# Patient Record
Sex: MALE | Race: WHITE
[De-identification: names, ages, dates, MRNs, and addresses within clinical notes are randomized per-mention and may not be internally consistent; named-entity substitution may affect disease eponyms.]

---

## 2017-01-02 ENCOUNTER — HOSPITAL ENCOUNTER (OUTPATIENT)
Dept: HOSPITAL 45 - C.NEUR | Age: 52
Discharge: HOME | End: 2017-01-02
Attending: INTERNAL MEDICINE
Payer: COMMERCIAL

## 2017-01-02 VITALS
BODY MASS INDEX: 47.74 KG/M2 | WEIGHT: 315 LBS | WEIGHT: 315 LBS | HEIGHT: 67.99 IN | BODY MASS INDEX: 47.74 KG/M2 | HEIGHT: 67.99 IN

## 2017-01-02 VITALS — DIASTOLIC BLOOD PRESSURE: 75 MMHG | SYSTOLIC BLOOD PRESSURE: 139 MMHG | HEART RATE: 81 BPM

## 2017-01-02 DIAGNOSIS — I48.0: ICD-10-CM

## 2017-01-02 DIAGNOSIS — G47.33: Primary | ICD-10-CM

## 2017-03-20 ENCOUNTER — HOSPITAL ENCOUNTER (OUTPATIENT)
Dept: HOSPITAL 45 - C.NEUR | Age: 52
Discharge: HOME | End: 2017-03-20
Attending: INTERNAL MEDICINE
Payer: COMMERCIAL

## 2017-03-20 VITALS — HEART RATE: 71 BPM | DIASTOLIC BLOOD PRESSURE: 63 MMHG | SYSTOLIC BLOOD PRESSURE: 116 MMHG

## 2017-03-20 VITALS
BODY MASS INDEX: 47.74 KG/M2 | HEIGHT: 67.99 IN | HEIGHT: 67.99 IN | WEIGHT: 315 LBS | BODY MASS INDEX: 47.74 KG/M2 | WEIGHT: 315 LBS

## 2017-03-20 DIAGNOSIS — G47.33: Primary | ICD-10-CM

## 2017-03-20 DIAGNOSIS — I48.0: ICD-10-CM

## 2017-03-20 DIAGNOSIS — E66.01: ICD-10-CM

## 2017-03-30 ENCOUNTER — HOSPITAL ENCOUNTER (OUTPATIENT)
Dept: HOSPITAL 45 - C.LABBFT | Age: 52
Discharge: HOME | End: 2017-03-30
Attending: INTERNAL MEDICINE
Payer: COMMERCIAL

## 2017-03-30 DIAGNOSIS — I13.10: Primary | ICD-10-CM

## 2017-03-30 DIAGNOSIS — N18.9: ICD-10-CM

## 2017-03-30 LAB
ANION GAP SERPL CALC-SCNC: 8 MMOL/L (ref 3–11)
BUN SERPL-MCNC: 16 MG/DL (ref 7–18)
BUN/CREAT SERPL: 15.9 (ref 10–20)
CALCIUM SERPL-MCNC: 9.5 MG/DL (ref 8.5–10.1)
CHLORIDE SERPL-SCNC: 99 MMOL/L (ref 98–107)
CO2 SERPL-SCNC: 32 MMOL/L (ref 21–32)
CREAT SERPL-MCNC: 0.99 MG/DL (ref 0.6–1.4)
GLUCOSE SERPL-MCNC: 204 MG/DL (ref 70–99)
PHOSPHATE SERPL-MCNC: 3.3 MG/DL (ref 2.5–4.9)
POTASSIUM SERPL-SCNC: 4.2 MMOL/L (ref 3.5–5.1)
SODIUM SERPL-SCNC: 139 MMOL/L (ref 136–145)

## 2017-04-26 ENCOUNTER — HOSPITAL ENCOUNTER (OUTPATIENT)
Dept: HOSPITAL 45 - C.LABBFT | Age: 52
Discharge: HOME | End: 2017-04-26
Attending: PHYSICIAN ASSISTANT
Payer: COMMERCIAL

## 2017-04-26 DIAGNOSIS — I13.10: ICD-10-CM

## 2017-04-26 DIAGNOSIS — E11.65: ICD-10-CM

## 2017-04-26 DIAGNOSIS — R97.20: Primary | ICD-10-CM

## 2017-04-26 LAB
ANION GAP SERPL CALC-SCNC: 2 MMOL/L (ref 3–11)
B-OH-BUTYR SERPL-SCNC: 1.11 MG/DL (ref 0.2–2.81)
BUN SERPL-MCNC: 18 MG/DL (ref 7–18)
BUN/CREAT SERPL: 12.7 (ref 10–20)
CALCIUM SERPL-MCNC: 8.9 MG/DL (ref 8.5–10.1)
CHLORIDE SERPL-SCNC: 96 MMOL/L (ref 98–107)
CHOLEST/HDLC SERPL: 2.3 {RATIO}
CO2 SERPL-SCNC: 35 MMOL/L (ref 21–32)
CREAT SERPL-MCNC: 1.4 MG/DL (ref 0.6–1.4)
GLUCOSE SERPL-MCNC: 426 MG/DL (ref 70–99)
GLUCOSE UR QL: 55 MG/DL
KETONES UR QL STRIP: 44 MG/DL
MAGNESIUM SERPL-MCNC: 2.4 MG/DL (ref 1.8–2.4)
NITRITE UR QL STRIP: 131 MG/DL (ref 0–150)
PH UR: 125 MG/DL (ref 0–200)
PHOSPHATE SERPL-MCNC: 3 MG/DL (ref 2.5–4.9)
POTASSIUM SERPL-SCNC: 4.6 MMOL/L (ref 3.5–5.1)
PSA SERPL-MCNC: 0.27 NG/ML (ref 0–4)
SODIUM SERPL-SCNC: 133 MMOL/L (ref 136–145)
VERY LOW DENSITY LIPOPROT CALC: 26 MG/DL

## 2017-04-27 LAB — EST. AVERAGE GLUCOSE BLD GHB EST-MCNC: 229 MG/DL

## 2017-09-14 ENCOUNTER — HOSPITAL ENCOUNTER (OUTPATIENT)
Dept: HOSPITAL 45 - C.NEUR | Age: 52
Discharge: HOME | End: 2017-09-14
Attending: PHYSICIAN ASSISTANT
Payer: COMMERCIAL

## 2017-09-14 VITALS — DIASTOLIC BLOOD PRESSURE: 52 MMHG | SYSTOLIC BLOOD PRESSURE: 91 MMHG | HEART RATE: 58 BPM

## 2017-09-14 VITALS
BODY MASS INDEX: 47.74 KG/M2 | HEIGHT: 67.99 IN | WEIGHT: 315 LBS | BODY MASS INDEX: 47.74 KG/M2 | WEIGHT: 315 LBS | HEIGHT: 67.99 IN

## 2017-09-14 VITALS — DIASTOLIC BLOOD PRESSURE: 69 MMHG | HEART RATE: 78 BPM | SYSTOLIC BLOOD PRESSURE: 101 MMHG

## 2017-09-14 DIAGNOSIS — G47.33: Primary | ICD-10-CM

## 2017-09-26 ENCOUNTER — HOSPITAL ENCOUNTER (OUTPATIENT)
Dept: HOSPITAL 45 - C.ULTR | Age: 52
Discharge: HOME | End: 2017-09-26
Attending: INTERNAL MEDICINE
Payer: COMMERCIAL

## 2017-09-26 DIAGNOSIS — E11.65: ICD-10-CM

## 2017-09-26 DIAGNOSIS — N28.9: Primary | ICD-10-CM

## 2017-09-26 DIAGNOSIS — C61: ICD-10-CM

## 2017-09-26 DIAGNOSIS — E66.01: ICD-10-CM

## 2017-09-26 LAB
ANION GAP SERPL CALC-SCNC: 3 MMOL/L (ref 3–11)
APPEARANCE UR: CLEAR
BASOPHILS # BLD: 0.02 K/UL (ref 0–0.2)
BASOPHILS NFR BLD: 0.2 %
BILIRUB UR-MCNC: (no result) MG/DL
BUN SERPL-MCNC: 22 MG/DL (ref 7–18)
BUN/CREAT SERPL: 20.3 (ref 10–20)
CALCIUM SERPL-MCNC: 9.1 MG/DL (ref 8.5–10.1)
CHLORIDE SERPL-SCNC: 97 MMOL/L (ref 98–107)
CO2 SERPL-SCNC: 35 MMOL/L (ref 21–32)
COLOR UR: YELLOW
COMPLETE: YES
CREAT SERPL-MCNC: 1.1 MG/DL (ref 0.6–1.4)
CREAT UR-MCNC: 86 MG/DL
EOSINOPHIL NFR BLD AUTO: 346 K/UL (ref 130–400)
GLUCOSE SERPL-MCNC: 129 MG/DL (ref 70–99)
HCT VFR BLD CALC: 39.9 % (ref 42–52)
IG%: 2.5 %
IMM GRANULOCYTES NFR BLD AUTO: 17.4 %
LYMPHOCYTES # BLD: 1.96 K/UL (ref 1.2–3.4)
MAGNESIUM SERPL-MCNC: 2.4 MG/DL (ref 1.8–2.4)
MANUAL MICROSCOPIC REQUIRED?: NO
MCH RBC QN AUTO: 29.2 PG (ref 25–34)
MCHC RBC AUTO-ENTMCNC: 32.3 G/DL (ref 32–36)
MCV RBC AUTO: 90.3 FL (ref 80–100)
MONOCYTES NFR BLD: 9 %
NEUTROPHILS # BLD AUTO: 2.5 %
NEUTROPHILS NFR BLD AUTO: 68.4 %
NITRITE UR QL STRIP: (no result)
PH UR STRIP: 8.5 [PH] (ref 4.5–7.5)
PHOSPHATE SERPL-MCNC: 2.5 MG/DL (ref 2.5–4.9)
PMV BLD AUTO: 9.4 FL (ref 7.4–10.4)
POTASSIUM SERPL-SCNC: 4.1 MMOL/L (ref 3.5–5.1)
PROT UR STRIP-MCNC: 12.2 MG/DL (ref 0–11.9)
PSA SERPL-MCNC: 0.15 NG/ML (ref 0–4)
RBC # BLD AUTO: 4.42 M/UL (ref 4.7–6.1)
REVIEW REQ?: NO
SODIUM SERPL-SCNC: 135 MMOL/L (ref 136–145)
SP GR UR STRIP: 1.02 (ref 1–1.03)
TSH SERPL-ACNC: 1.46 UIU/ML (ref 0.3–4.5)
URINE EPITHELIAL CELL AUTO: (no result) /LPF (ref 0–5)
URINE PROTIEN/CREAT RATIO: 0.1 (ref 0–0.2)
UROBILINOGEN UR-MCNC: (no result) MG/DL
WBC # BLD AUTO: 11.29 K/UL (ref 4.8–10.8)
ZZUR CULT IF INDIC CLEAN CATCH: NO

## 2017-09-26 NOTE — DIAGNOSTIC IMAGING REPORT
EXAMINATION: RENAL ULTRASOUND



CLINICAL HISTORY: N28.9 Acute renal [id number]    



COMPARISON STUDY:  8/28/2015



FINDINGS: The right kidney measures 14.5 cm. The left kidney measures 14.4 cm. 

There is no evidence of hydronephrosis.  There are no renal masses.



The bladder appears sonographically normal. Neither ureteral jet was visualized.

                 

IMPRESSION : No renal abnormalities identified. Neither ureteral jet was

visualized.







Electronically signed by:  James Palomares M.D.

9/26/2017 2:48 PM



Dictated Date/Time:  9/26/2017 2:44 PM

## 2017-09-27 LAB — EST. AVERAGE GLUCOSE BLD GHB EST-MCNC: 197 MG/DL

## 2017-10-12 ENCOUNTER — HOSPITAL ENCOUNTER (OUTPATIENT)
Dept: HOSPITAL 45 - C.LABBFT | Age: 52
Discharge: HOME | End: 2017-10-12
Attending: INTERNAL MEDICINE
Payer: COMMERCIAL

## 2017-10-12 DIAGNOSIS — N28.9: Primary | ICD-10-CM

## 2017-10-12 LAB
ANION GAP SERPL CALC-SCNC: 6 MMOL/L (ref 3–11)
BUN SERPL-MCNC: 28 MG/DL (ref 7–18)
BUN/CREAT SERPL: 25.2 (ref 10–20)
CALCIUM SERPL-MCNC: 9.2 MG/DL (ref 8.5–10.1)
CHLORIDE SERPL-SCNC: 97 MMOL/L (ref 98–107)
CO2 SERPL-SCNC: 30 MMOL/L (ref 21–32)
CREAT SERPL-MCNC: 1.1 MG/DL (ref 0.6–1.4)
GLUCOSE SERPL-MCNC: 232 MG/DL (ref 70–99)
MAGNESIUM SERPL-MCNC: 2 MG/DL (ref 1.8–2.4)
PHOSPHATE SERPL-MCNC: 2.8 MG/DL (ref 2.5–4.9)
POTASSIUM SERPL-SCNC: 4.3 MMOL/L (ref 3.5–5.1)
SODIUM SERPL-SCNC: 133 MMOL/L (ref 136–145)

## 2017-11-16 ENCOUNTER — HOSPITAL ENCOUNTER (EMERGENCY)
Dept: HOSPITAL 45 - C.EDB | Age: 52
Discharge: HOME | End: 2017-11-16
Payer: COMMERCIAL

## 2017-11-16 VITALS
BODY MASS INDEX: 47.74 KG/M2 | BODY MASS INDEX: 47.74 KG/M2 | HEIGHT: 67.99 IN | WEIGHT: 315 LBS | HEIGHT: 67.99 IN | WEIGHT: 315 LBS

## 2017-11-16 VITALS
DIASTOLIC BLOOD PRESSURE: 84 MMHG | SYSTOLIC BLOOD PRESSURE: 158 MMHG | HEART RATE: 85 BPM | TEMPERATURE: 98.6 F | OXYGEN SATURATION: 94 %

## 2017-11-16 DIAGNOSIS — Z83.3: ICD-10-CM

## 2017-11-16 DIAGNOSIS — M79.671: Primary | ICD-10-CM

## 2017-11-16 DIAGNOSIS — I10: ICD-10-CM

## 2017-11-16 DIAGNOSIS — Z82.49: ICD-10-CM

## 2017-11-16 DIAGNOSIS — E11.40: ICD-10-CM

## 2017-11-16 DIAGNOSIS — Z79.4: ICD-10-CM

## 2017-11-16 NOTE — EMERGENCY ROOM VISIT NOTE
History


First contact with patient:  19:56


Chief Complaint:  FOOT PAIN


Stated Complaint:  THINKS SOMETHING IS STUCK IN FOOT





History of Present Illness


The patient is a 52 year old male who presents to the Emergency Room via 

private vehicle accompanied by male with complaints of "thinks something is 

stuck in foot".  The patient states that about 4 weeks ago he began with pain 

in the right heel just anterior in the midfoot region.  He has a history of 

neuropathy and thinks he may have stepped on broken glass but is not sure.  He 

states that this has been progressing for the past 4 weeks and now over the 

past day has acutely worsened.  If he initially walks on it is painful and gets 

better with periods of walking but when he rests and then goes to walk again 

the pain is excruciating.  He rates the pain as an 8/10.





Review of Systems


A complete 6-point Review of Systems was discussed with the patient, with 

pertinent positives and negatives listed in the History of Present Illness. All 

remaining Review of Systems questions can be considered negative unless 

otherwise specified.





Past Medical/Surgical History


Medical Problems:


(1) Diabetes


(2) Heart disease


(3) Hypertension








Family History





Diabetes mellitus


FH: heart disease


Hypertension





Social History


Smoking Status:  Never Smoker


Drug Use:  none


Marital Status:  single, in relationship


Housing Status:  lives with family


Occupation Status:  unemployed





Current/Historical Medications


Scheduled


Allopurinol (Zyloprim), 1 TAB PO DAILY


Apixaban (Eliquis), 5 MG PO BID


Digoxin (Digoxin), 0.25 MG PO DAILY


Flecainide Acetate (Flecainide Acetate), 150 MG PO BID


Furosemide (Lasix), 40-80 MG PO BID


Home O2 Therapy (Oxygen), 2 LITERS NA PRN


Insulin Aspart (Novolog), SQ TIDM


Insulin Glargine (Lantus Solostar), 78 SC QPM


Lisinopril (Zestril), 20 MG PO QAM


Metolazone (Zaroxolyn), 2.5 MG PO DAILY


Metoprolol Succ (Toprol Xl) (Toprol-Xl), 50 MG PO QAM


Simvastatin (Zocor), 80 MG PO QPM


Tamsulosin HCl (Tamsulosin HCl), 2 TABS PO HS





Scheduled PRN


Oxycodone Ir (Roxicodone Ir), 1-2 TAB PO Q4H PRN for Pain





Physical Exam


Vital Signs











  Date Time  Temp Pulse Resp B/P (MAP) Pulse Ox O2 Delivery O2 Flow Rate FiO2


 


11/16/17 21:38 37.0 85 20 158/84 94 Room Air  


 


11/16/17 19:53 37.3 87 20 175/101 93 Room Air  











Physical Exam


VITAL SIGNS - Vital signs and nursing notes were reviewed.  Stable.  

Hypertensive.


GENERAL -52-year-old male appearing his stated age who is in no acute distress. 

Communicates well with provider and answers questions appropriately.


SKIN - Without rashes.  There is no erythema or edema of the foot.  The bottom 

of the foot is unremarkable.  There is pinpoint tenderness just anterior to the 

right heel.


HEAD - NC/AT.


EXTREMITIES - No clubbing or peripheral cyanosis. No pretibial edema present.  

Pinpoint tenderness just anterior to the base of the right heel.  There is no 

evidence of foreign body.  There is no evidence of infection.  There is no 

edema or erythema.  He is neurovascularly intact in this region.  +5/5 strength 

noted in UE/LE bilaterally.





Medical Decision & Procedures


ER Provider


Diagnostic Interpretation:


RIGHT FOOT 3 VIEWS





HISTORY:      Right anterior heel pain, possible foreign body from walking.





COMPARISON: None.





FINDINGS: There is no fracture or dislocation. Soft tissues are unremarkable. No


radiopaque foreign bodies. Small plantar heel spur.





IMPRESSION:  


No radiopaque foreign bodies seen within the right foot.











Electronically signed by:  Jj Segura M.D.


11/16/2017 9:12 PM





Dictated Date/Time:  11/16/2017 9:11 PM





Laboratory Results











Test


  11/16/17


20:57


 


Bedside Glucose


  247 mg/dl


(70-99)











Medications Administered











 Medications


  (Trade)  Dose


 Ordered  Sig/Mary


 Route  Start Time


 Stop Time Status Last Admin


Dose Admin


 


 Oxycodone HCl


  (Roxicodone


 Immediate Rel 5MG


 Home Pack)  1 homepack  UD  STAT


 PO  11/16/17 21:29


 11/16/17 21:30 DC 11/16/17 21:35


1 HOMEPACK











Medical Decision


Patient was seen and evaluated as above.  He presents to us today with right 

foot pain.  He states he believes he stepped on something but is unsure.  His 

tetanus is up-to-date.  Pain has been for about 4 weeks and acutely worsened 

over the past day.  There is been no fevers, chills erythema or edema.  There 

is pinpoint tenderness on exam.  I suspect either plantar fasciitis or retained 

foreign body.  X-rays were obtained with results as above.  No foreign body 

noted.  Although it is possible other could still be a small piece of glass, 

his history and physical exam is more consistent with that of plantar 

fasciitis.  His pain is better after walking for prolonged period of times and 

is worse after resting and then going to walk again.  It is also worse with 

dorsiflexion.  I suspect that freezing a bottle of water and doing exercises 

without a tennis ball will certainly help with this.  He was offered a splint 

and crutches and declined.  He is to follow with orthopedics regarding his 

injury today.  He was educated upon management, educated upon worrisome 

symptoms which to return, had questions prior to discharge, and was discharged 

home with male driving in good condition.  His blood sugar at this time was 

247.  He will be given a short prescription of oxycodone for pain of which she 

was also given here.  He was educated upon management and use.  He was 

hypertensive I believe secondary to his presentation but he was made aware of 

this.








In the evaluation and treatment of this patient, the following differential 

diagnoses were considered: Lisfranc Fracture, retained foreign body, plantar 

fasciitis, osteomyelitis, Talus Fracture, Tarsal Fracture, Foot Sprain.





[place] Drug Monitoring Program


Search Results:  patient reviewed within database, no issues identified





Impression





 Primary Impression:  


 Foot pain





Departure Information


Dispostion


Home / Self-Care





Condition


GOOD





Prescriptions





Oxycodone Ir (Roxicodone Ir) 5 Mg Tab


1-2 TAB PO Q4H Y for Pain, #15 TAB


   For Initial Treatment


   Prov: Geovanny Mcgraw, TALIA         11/16/17





Referrals


Filippo Burris M.D. (PCP)








Beck Niño D.O.





Patient Instructions


My [facility]





Additional Instructions





You have been treated in the Emergency Department for a foot pain/injury. 





You have been prescribed Oxy IR to be used for pain control. This is a narcotic 

medication. You cannot drive or consume alcohol while on this medicine. This 

medicine should only be used for pain that cannot be controlled with over-the-

counter pain medicines.








You have been provided the number for an Orthopaedic Surgeon. You should call 

this number as soon as possible to establish a follow-up visit from today's 

Emergency Department visit.








Return to the Emergency Department if your current symptoms worsen despite 

treatment course outlined above, or if you develop any of the following symptoms

: intractable pain despite aforementioned treatment course or new onset of 

numbness or tingling of the foot.

## 2017-11-16 NOTE — DIAGNOSTIC IMAGING REPORT
RIGHT FOOT 3 VIEWS



HISTORY:      Right anterior heel pain, possible foreign body from walking.



COMPARISON: None.



FINDINGS: There is no fracture or dislocation. Soft tissues are unremarkable. No

radiopaque foreign bodies. Small plantar heel spur.



IMPRESSION:  

No radiopaque foreign bodies seen within the right foot.







Electronically signed by:  Jj Segura M.D.

11/16/2017 9:12 PM



Dictated Date/Time:  11/16/2017 9:11 PM

## 2018-01-12 ENCOUNTER — HOSPITAL ENCOUNTER (OUTPATIENT)
Dept: HOSPITAL 45 - C.LABBFT | Age: 53
Discharge: HOME | End: 2018-01-12
Attending: PHYSICIAN ASSISTANT
Payer: COMMERCIAL

## 2018-01-12 DIAGNOSIS — I13.10: ICD-10-CM

## 2018-01-12 DIAGNOSIS — N18.9: ICD-10-CM

## 2018-01-12 DIAGNOSIS — R60.9: ICD-10-CM

## 2018-01-12 DIAGNOSIS — E11.65: Primary | ICD-10-CM

## 2018-01-12 LAB
BUN SERPL-MCNC: 23 MG/DL (ref 7–18)
CALCIUM SERPL-MCNC: 9.7 MG/DL (ref 8.5–10.1)
CO2 SERPL-SCNC: 33 MMOL/L (ref 21–32)
CREAT SERPL-MCNC: 1.12 MG/DL (ref 0.6–1.4)
GLUCOSE SERPL-MCNC: 161 MG/DL (ref 70–99)
POTASSIUM SERPL-SCNC: 4.7 MMOL/L (ref 3.5–5.1)
SODIUM SERPL-SCNC: 132 MMOL/L (ref 136–145)

## 2018-01-13 LAB — HBA1C MFR BLD: 9.7 % (ref 4.5–5.6)

## 2018-03-19 ENCOUNTER — HOSPITAL ENCOUNTER (OUTPATIENT)
Dept: HOSPITAL 45 - C.LABBFT | Age: 53
Discharge: HOME | End: 2018-03-19
Attending: INTERNAL MEDICINE
Payer: COMMERCIAL

## 2018-03-19 DIAGNOSIS — R39.12: ICD-10-CM

## 2018-03-19 DIAGNOSIS — N18.9: Primary | ICD-10-CM

## 2018-03-19 DIAGNOSIS — N40.0: ICD-10-CM

## 2018-03-19 DIAGNOSIS — C61: ICD-10-CM

## 2018-03-19 DIAGNOSIS — R39.15: ICD-10-CM

## 2018-03-19 DIAGNOSIS — M10.9: ICD-10-CM

## 2018-03-19 DIAGNOSIS — R97.20: ICD-10-CM

## 2018-03-19 DIAGNOSIS — R39.11: ICD-10-CM

## 2018-03-19 DIAGNOSIS — I48.92: ICD-10-CM

## 2018-03-19 LAB
ALBUMIN SERPL-MCNC: 3.4 GM/DL (ref 3.4–5)
ALP SERPL-CCNC: 55 U/L (ref 45–117)
ALT SERPL-CCNC: 30 U/L (ref 12–78)
AST SERPL-CCNC: 23 U/L (ref 15–37)
BASOPHILS # BLD: 0.02 K/UL (ref 0–0.2)
BASOPHILS NFR BLD: 0.2 %
BUN SERPL-MCNC: 26 MG/DL (ref 7–18)
CALCIUM SERPL-MCNC: 9.4 MG/DL (ref 8.5–10.1)
CO2 SERPL-SCNC: 31 MMOL/L (ref 21–32)
CREAT SERPL-MCNC: 1.06 MG/DL (ref 0.6–1.4)
EOS ABS #: 0.16 K/UL (ref 0–0.5)
EOSINOPHIL NFR BLD AUTO: 260 K/UL (ref 130–400)
GLUCOSE SERPL-MCNC: 178 MG/DL (ref 70–99)
HCT VFR BLD CALC: 35.1 % (ref 42–52)
HGB BLD-MCNC: 11.7 G/DL (ref 14–18)
IG#: 0.13 K/UL (ref 0–0.02)
IMM GRANULOCYTES NFR BLD AUTO: 19.4 %
LYMPHOCYTES # BLD: 1.78 K/UL (ref 1.2–3.4)
MCH RBC QN AUTO: 30.1 PG (ref 25–34)
MCHC RBC AUTO-ENTMCNC: 33.3 G/DL (ref 32–36)
MCV RBC AUTO: 90.2 FL (ref 80–100)
MONO ABS #: 0.62 K/UL (ref 0.11–0.59)
MONOCYTES NFR BLD: 6.8 %
NEUT ABS #: 6.45 K/UL (ref 1.4–6.5)
NEUTROPHILS # BLD AUTO: 1.7 %
NEUTROPHILS NFR BLD AUTO: 70.5 %
PHOSPHATE SERPL-MCNC: 2.3 MG/DL (ref 2.5–4.9)
PMV BLD AUTO: 9.8 FL (ref 7.4–10.4)
POTASSIUM SERPL-SCNC: 4.9 MMOL/L (ref 3.5–5.1)
PROT SERPL-MCNC: 7.4 GM/DL (ref 6.4–8.2)
RED CELL DISTRIBUTION WIDTH CV: 14.2 % (ref 11.5–14.5)
RED CELL DISTRIBUTION WIDTH SD: 46.5 FL (ref 36.4–46.3)
SODIUM SERPL-SCNC: 138 MMOL/L (ref 136–145)
URATE SERPL-MCNC: 7.9 MG/DL (ref 2.6–7.2)
WBC # BLD AUTO: 9.16 K/UL (ref 4.8–10.8)

## 2018-03-26 ENCOUNTER — HOSPITAL ENCOUNTER (EMERGENCY)
Dept: HOSPITAL 45 - C.EDB | Age: 53
Discharge: HOME | End: 2018-03-26
Payer: COMMERCIAL

## 2018-03-26 VITALS
WEIGHT: 315 LBS | HEIGHT: 67.99 IN | WEIGHT: 315 LBS | HEIGHT: 67.99 IN | BODY MASS INDEX: 47.74 KG/M2 | BODY MASS INDEX: 47.74 KG/M2

## 2018-03-26 VITALS — OXYGEN SATURATION: 98 % | HEART RATE: 112 BPM | DIASTOLIC BLOOD PRESSURE: 68 MMHG | SYSTOLIC BLOOD PRESSURE: 106 MMHG

## 2018-03-26 VITALS — TEMPERATURE: 98.42 F

## 2018-03-26 DIAGNOSIS — E11.9: ICD-10-CM

## 2018-03-26 DIAGNOSIS — Z99.81: ICD-10-CM

## 2018-03-26 DIAGNOSIS — Z79.01: ICD-10-CM

## 2018-03-26 DIAGNOSIS — E86.0: Primary | ICD-10-CM

## 2018-03-26 DIAGNOSIS — I48.91: ICD-10-CM

## 2018-03-26 DIAGNOSIS — Z91.030: ICD-10-CM

## 2018-03-26 DIAGNOSIS — Z82.49: ICD-10-CM

## 2018-03-26 DIAGNOSIS — Z79.84: ICD-10-CM

## 2018-03-26 DIAGNOSIS — I95.1: ICD-10-CM

## 2018-03-26 DIAGNOSIS — Z83.3: ICD-10-CM

## 2018-03-26 DIAGNOSIS — Z91.048: ICD-10-CM

## 2018-03-26 DIAGNOSIS — I11.9: ICD-10-CM

## 2018-03-26 DIAGNOSIS — Z79.4: ICD-10-CM

## 2018-03-26 LAB
ALBUMIN SERPL-MCNC: 3.4 GM/DL (ref 3.4–5)
ALP SERPL-CCNC: 59 U/L (ref 45–117)
ALT SERPL-CCNC: 35 U/L (ref 12–78)
AST SERPL-CCNC: 24 U/L (ref 15–37)
BASOPHILS # BLD: 0.02 K/UL (ref 0–0.2)
BASOPHILS NFR BLD: 0.2 %
BUN SERPL-MCNC: 49 MG/DL (ref 7–18)
CALCIUM SERPL-MCNC: 8.4 MG/DL (ref 8.5–10.1)
CK MB SERPL-MCNC: 2.5 NG/ML (ref 0.5–3.6)
CO2 SERPL-SCNC: 30 MMOL/L (ref 21–32)
CREAT SERPL-MCNC: 1.86 MG/DL (ref 0.6–1.4)
EOS ABS #: 0.21 K/UL (ref 0–0.5)
EOSINOPHIL NFR BLD AUTO: 299 K/UL (ref 130–400)
GLUCOSE SERPL-MCNC: 191 MG/DL (ref 70–99)
HCT VFR BLD CALC: 37.3 % (ref 42–52)
HGB BLD-MCNC: 12.7 G/DL (ref 14–18)
IG#: 0.17 K/UL (ref 0–0.02)
IMM GRANULOCYTES NFR BLD AUTO: 25.4 %
LIPASE: 203 U/L (ref 73–393)
LYMPHOCYTES # BLD: 2.59 K/UL (ref 1.2–3.4)
MCH RBC QN AUTO: 30.5 PG (ref 25–34)
MCHC RBC AUTO-ENTMCNC: 34 G/DL (ref 32–36)
MCV RBC AUTO: 89.4 FL (ref 80–100)
MONO ABS #: 0.82 K/UL (ref 0.11–0.59)
MONOCYTES NFR BLD: 8.1 %
NEUT ABS #: 6.37 K/UL (ref 1.4–6.5)
NEUTROPHILS # BLD AUTO: 2.1 %
NEUTROPHILS NFR BLD AUTO: 62.5 %
PMV BLD AUTO: 9.4 FL (ref 7.4–10.4)
POTASSIUM SERPL-SCNC: 3.9 MMOL/L (ref 3.5–5.1)
PROT SERPL-MCNC: 7.5 GM/DL (ref 6.4–8.2)
RED CELL DISTRIBUTION WIDTH CV: 14.2 % (ref 11.5–14.5)
RED CELL DISTRIBUTION WIDTH SD: 46.3 FL (ref 36.4–46.3)
SODIUM SERPL-SCNC: 132 MMOL/L (ref 136–145)
WBC # BLD AUTO: 10.18 K/UL (ref 4.8–10.8)

## 2018-03-26 NOTE — DIAGNOSTIC IMAGING REPORT
CHEST ONE VIEW PORTABLE



CLINICAL HISTORY: Atypical chest pain    



COMPARISON STUDY:  11/3/2016



FINDINGS: The heart is at the upper limits of normal in size. Indistinctness of

the left cardiophrenic angle is likely secondary to a prominent fat pad.

Mediastinal fullness is likely secondary to mediastinal fat deposition. There is

no failure. There is no focal pulmonary consolidation. There are no pleural

effusions.[ 



IMPRESSION: No active disease in the chest.







Electronically signed by:  James Palomares M.D.

3/26/2018 6:18 PM



Dictated Date/Time:  3/26/2018 6:17 PM

## 2018-03-26 NOTE — EMERGENCY ROOM VISIT NOTE
History


Report prepared by Katharina:  Michelle Hill


Under the Supervision of:  Dr. Edgar Moses M.D.


First contact with patient:  17:44


Chief Complaint:  IRREGULAR HEARTBEAT


Stated Complaint:  REF BY BRANDI REYES


Nursing Triage Summary:  


Patient was Oliver keller for an appointment and states he was c/o 


dizziness and Provider stated he was in Afib and sent for eval.  History of 

afib 


on blood thinners.





History of Present Illness


The patient is a 52 year old male who presents to the Emergency Room with 

complaints of persistent irregular heartbeat starting PTA. The patient was sent 

to the ED from his appointment with the bariatric doctor today over concerns 

that he is in atrial fibrillation. The patient states that he has a history of 

atrial fibrillation and is on Eliquis. He reports feeling lightheaded, dizzy, 

and SOB while trimming some trees and brush today. He notes that he is more 

swollen than usual. He is on Lasix.





   Source of History:  patient


   Onset:  PTA


   Position:  other (heartbeat)


   Quality:  other (irregular)


   Timing:  other (persistent)


   Associated Symptoms:  + SOB


Note:


Pt reports lightheadedness, dizziness.





Review of Systems


See HPI for pertinent positives & negatives. A total of 10 systems reviewed and 

were otherwise negative.





Past Medical & Surgical


Medical Problems:


(1) Diabetes


(2) Heart disease


(3) Hypertension








Family History





Diabetes mellitus


FH: heart disease


Hypertension





Social History


Smoking Status:  Never Smoker


Drug Use:  none


Housing Status:  lives with family


Occupation Status:  unemployed





Current/Historical Medications


Scheduled


Allopurinol (Zyloprim), 1 TAB PO DAILY


Apixaban (Eliquis), 5 MG PO BID


Digoxin (Digoxin), 0.25 MG PO DAILY


Flecainide Acetate (Flecainide Acetate), 150 MG PO BID


Furosemide (Lasix), 40 MG PO QPM


Furosemide (Lasix), 80 MG PO QAM


Home O2 Therapy (Oxygen), 2 LITERS NA PRN


Insulin Aspart (Novolog), SQ TIDM


Insulin Glargine (Lantus Solostar), 78 UNITS SQ QPM


Lisinopril (Zestril), 20 MG PO QAM


Metformin Hcl (Glucophage), 1,000 MG PO BID


Metolazone (Zaroxolyn), 2.5 MG PO DAILY


Metoprolol Succ (Toprol Xl) (Toprol-Xl), 50 MG PO QAM


Simvastatin (Zocor), 80 MG PO QPM


Tamsulosin HCl (Tamsulosin HCl), 0.8 MG PO HS





Allergies


Coded Allergies:  


     BEE STING (Verified  Allergy, Severe, ANAPHYLAXIS, 11/3/16)


     NO KNOWN DRUG ALLERGIES (Unverified  Allergy, Unknown, NONE, 11/3/16)


     Adhesives (Unverified  Adverse Reaction, Intermediate, BANDAIDS-RIPS SKIN 

OFF, 11/3/16)





Physical Exam


Vital Signs











  Date Time  Temp Pulse Resp B/P (MAP) Pulse Ox O2 Delivery O2 Flow Rate FiO2


 


3/26/18 19:04  86  126/76 98 Room Air  





  105  115/72    





  112  106/68    


 


3/26/18 16:57 36.9 90 20 110/65 93 Room Air  











Physical Exam


GENERAL: Awake, alert, well-appearing, in no acute distress


HENT: Normocephalic, atraumatic. Oropharynx unremarkable.


EYES: Normal conjunctiva. Sclera non-icteric.


NECK: Supple. No nuchal rigidity. FROM. No JVD.


RESPIRATORY: Clear to auscultation.


CARDIAC: Regular rate, irregularly irregular rhythm. Extremities warm and well 

perfused. Pulses equal.


ABDOMEN: Soft, non-distended. No tenderness to palpation. No rebound or 

guarding. No masses.


RECTAL: Deferred.


MUSCULOSKELETAL: Chest examination reveals no tenderness. The back is 

symmetrical on inspection without obvious abnormality. There is no CVA 

tenderness to palpation. No joint edema. 


LOWER EXTREMITIES: Calves are equal size bilaterally and non-tender. No edema. 

No discoloration. 


NEURO: Normal sensorium. No sensory or motor deficits noted. 


SKIN: No rash or jaundice noted.





Medical Decision & Procedures


ER Provider


Diagnostic Interpretation:


X-ray results as stated below per interpretation by me and the radiologist: 





CHEST ONE VIEW PORTABLE





CLINICAL HISTORY: Atypical chest pain    





COMPARISON STUDY:  11/3/2016





FINDINGS: The heart is at the upper limits of normal in size. Indistinctness of


the left cardiophrenic angle is likely secondary to a prominent fat pad.


Mediastinal fullness is likely secondary to mediastinal fat deposition. There is


no failure. There is no focal pulmonary consolidation. There are no pleural


effusions.[ 





IMPRESSION: No active disease in the chest.





Electronically signed by:  James Palomares M.D.


3/26/2018 6:18 PM





Dictated Date/Time:  3/26/2018 6:17 PM





Laboratory Results


3/26/18 18:08








Red Blood Count 4.17, Mean Corpuscular Volume 89.4, Mean Corpuscular Hemoglobin 

30.5, Mean Corpuscular Hemoglobin Concent 34.0, Mean Platelet Volume 9.4, 

Neutrophils (%) (Auto) 62.5, Lymphocytes (%) (Auto) 25.4, Monocytes (%) (Auto) 

8.1, Eosinophils (%) (Auto) 2.1, Basophils (%) (Auto) 0.2, Neutrophils # (Auto) 

6.37, Lymphocytes # (Auto) 2.59, Monocytes # (Auto) 0.82, Eosinophils # (Auto) 

0.21, Basophils # (Auto) 0.02





3/26/18 18:08

















Test


  3/26/18


18:08


 


White Blood Count


  10.18 K/uL


(4.8-10.8)


 


Red Blood Count


  4.17 M/uL


(4.7-6.1)


 


Hemoglobin


  12.7 g/dL


(14.0-18.0)


 


Hematocrit 37.3 % (42-52) 


 


Mean Corpuscular Volume


  89.4 fL


()


 


Mean Corpuscular Hemoglobin


  30.5 pg


(25-34)


 


Mean Corpuscular Hemoglobin


Concent 34.0 g/dl


(32-36)


 


Platelet Count


  299 K/uL


(130-400)


 


Mean Platelet Volume


  9.4 fL


(7.4-10.4)


 


Neutrophils (%) (Auto) 62.5 % 


 


Lymphocytes (%) (Auto) 25.4 % 


 


Monocytes (%) (Auto) 8.1 % 


 


Eosinophils (%) (Auto) 2.1 % 


 


Basophils (%) (Auto) 0.2 % 


 


Neutrophils # (Auto)


  6.37 K/uL


(1.4-6.5)


 


Lymphocytes # (Auto)


  2.59 K/uL


(1.2-3.4)


 


Monocytes # (Auto)


  0.82 K/uL


(0.11-0.59)


 


Eosinophils # (Auto)


  0.21 K/uL


(0-0.5)


 


Basophils # (Auto)


  0.02 K/uL


(0-0.2)


 


RDW Standard Deviation


  46.3 fL


(36.4-46.3)


 


RDW Coefficient of Variation


  14.2 %


(11.5-14.5)


 


Immature Granulocyte % (Auto) 1.7 % 


 


Immature Granulocyte # (Auto)


  0.17 K/uL


(0.00-0.02)


 


Anion Gap


  8.0 mmol/L


(3-11)


 


Est Creatinine Clear Calc


Drug Dose 71.1 ml/min 


 


 


Estimated GFR (


American) 46.8 


 


 


Estimated GFR (Non-


American 40.4 


 


 


BUN/Creatinine Ratio 26.3 (10-20) 


 


Calcium Level


  8.4 mg/dl


(8.5-10.1)


 


Total Bilirubin


  0.3 mg/dl


(0.2-1)


 


Direct Bilirubin


  < 0.1 mg/dl


(0-0.2)


 


Aspartate Amino Transf


(AST/SGOT) 24 U/L (15-37) 


 


 


Alanine Aminotransferase


(ALT/SGPT) 35 U/L (12-78) 


 


 


Alkaline Phosphatase


  59 U/L


()


 


Total Creatine Kinase


  396 U/L


()


 


Creatine Kinase MB


  2.5 ng/ml


(0.5-3.6)


 


Creatine Kinase MB Ratio 0.6 (0-3.0) 


 


Troponin I


  < 0.015 ng/ml


(0-0.045)


 


Pro-B-Type Natriuretic Peptide


  690 pg/ml


(0-900)


 


Total Protein


  7.5 gm/dl


(6.4-8.2)


 


Albumin


  3.4 gm/dl


(3.4-5.0)


 


Lipase


  203 U/L


()


 


Digoxin Level


  0.7 ng/ml


(0.8-2.0)





Labs reviewed by ED physician.





ECG Per My Interpretation


Indication:  palpitations


Rate (beats per minute):  99


Rhythm:  atrial fibrillation


Findings:  T-wave inversion (Anterior), other (no ST elevation or depression)





ED Course


1749: Past medical records reviewed. The patient was evaluated in room A4A. A 

complete history and physical examination was performed. 





1906: Upon reexamination the patient is resting comfortably. I discussed 

results and treatment plan with the patient. He verbalizes agreement and 

understanding. The patient is ready for discharge.





Medical Decision


Differential diagnosis:


Etiologies such as premature contractions, electrolyte abnormality, cardiac 

dysrhythmia, thyroid dysfunction, pulmonary embolism, infection, 

gastrointestinal, as well as others were entertained.





-This is a 52-year-old male who presents emergency department complaining of 

shortness of breath.  The patient is in atrial fibrillation and was in RVR at 

his primary care physician's office.  The patient was sent in for this.  The 

patient appears dry on examination and I recommended fluid which he refused.  

In addition he does have orthostatic hypotension and his creatinine is.  Again 

recommended normal saline bolus.  Patient is refusing to be admitted to the 

hospital as he recently had a very lengthy stay.  Strongly recommended to the 

patient he increase his fluids over the next 48 hours.  Patient was in 

agreement with treatment plan.








Medication Reconcilliation


Current Medication List:  was personally reviewed by me





Blood Pressure Screening


Patient's blood pressure:  Normal blood pressure


Blood pressure disposition:  Did not require urgent referral





Impression





 Primary Impression:  


 Dehydration





Scribe Attestation


The scribe's documentation has been prepared under my direction and personally 

reviewed by me in its entirety. I confirm that the note above accurately 

reflects all work, treatment, procedures, and medical decision making performed 

by me.





Departure Information


Dispostion


Home / Self-Care





Referrals


Filippo Burris M.D. (PCP)





Forms


HOME CARE DOCUMENTATION FORM,                                                 

               IMPORTANT VISIT INFORMATION





Patient Instructions


ED Dehydration, ED Hypotension Orthostatic, My [facility]





Additional Instructions





Increase fluids next 48 hours














You have been examined and treated today on an emergency basis only. This is 

not a substitute for, or an effort to provide, complete comprehensive medical 

care. It is impossible to recognize and treat all injuries or illnesses in a 

single emergency department visit. It is therefore important that you follow up 

closely with Dr Burris.  Call as soon as possible for an appointment.  





Thank you for your time and consideration.  I look forward to speaking with you 

again soon.  Please don't hesitate to call us if you have any questions.

## 2018-04-27 ENCOUNTER — HOSPITAL ENCOUNTER (OUTPATIENT)
Dept: HOSPITAL 45 - C.NUCL | Age: 53
Discharge: HOME | End: 2018-04-27
Attending: INTERNAL MEDICINE
Payer: COMMERCIAL

## 2018-04-27 DIAGNOSIS — I25.10: Primary | ICD-10-CM

## 2018-04-27 DIAGNOSIS — R06.09: ICD-10-CM

## 2018-05-07 ENCOUNTER — HOSPITAL ENCOUNTER (OUTPATIENT)
Dept: HOSPITAL 45 - C.LABBFT | Age: 53
Discharge: HOME | End: 2018-05-07
Attending: PHYSICIAN ASSISTANT
Payer: COMMERCIAL

## 2018-05-07 DIAGNOSIS — E11.65: Primary | ICD-10-CM

## 2018-05-07 DIAGNOSIS — N18.9: ICD-10-CM

## 2018-05-07 LAB
ALBUMIN SERPL-MCNC: 3.6 GM/DL (ref 3.4–5)
ALT SERPL-CCNC: 37 U/L (ref 12–78)
BUN SERPL-MCNC: 32 MG/DL (ref 7–18)
CALCIUM SERPL-MCNC: 9.6 MG/DL (ref 8.5–10.1)
CO2 SERPL-SCNC: 32 MMOL/L (ref 21–32)
CREAT SERPL-MCNC: 1.38 MG/DL (ref 0.6–1.4)
GLUCOSE SERPL-MCNC: 243 MG/DL (ref 70–99)
HBA1C MFR BLD: 8.5 % (ref 4.5–5.6)
KETONES UR QL STRIP: 43 MG/DL
PH UR: 118 MG/DL (ref 0–200)
PHOSPHATE SERPL-MCNC: 2.9 MG/DL (ref 2.5–4.9)
POTASSIUM SERPL-SCNC: 5 MMOL/L (ref 3.5–5.1)
SODIUM SERPL-SCNC: 138 MMOL/L (ref 136–145)